# Patient Record
Sex: FEMALE | Race: OTHER | HISPANIC OR LATINO | URBAN - METROPOLITAN AREA
[De-identification: names, ages, dates, MRNs, and addresses within clinical notes are randomized per-mention and may not be internally consistent; named-entity substitution may affect disease eponyms.]

---

## 2021-10-14 ENCOUNTER — EMERGENCY (EMERGENCY)
Facility: HOSPITAL | Age: 26
LOS: 1 days | Discharge: HOME | End: 2021-10-14
Attending: EMERGENCY MEDICINE
Payer: SELF-PAY

## 2021-10-14 VITALS
HEART RATE: 83 BPM | TEMPERATURE: 98 F | OXYGEN SATURATION: 98 % | WEIGHT: 156.53 LBS | SYSTOLIC BLOOD PRESSURE: 137 MMHG | RESPIRATION RATE: 18 BRPM | DIASTOLIC BLOOD PRESSURE: 87 MMHG

## 2021-10-14 LAB — HCG UR QL: NEGATIVE — SIGNIFICANT CHANGE UP

## 2021-10-14 PROCEDURE — 99284 EMERGENCY DEPT VISIT MOD MDM: CPT

## 2021-10-14 RX ORDER — OXYCODONE AND ACETAMINOPHEN 5; 325 MG/1; MG/1
1 TABLET ORAL ONCE
Refills: 0 | Status: DISCONTINUED | OUTPATIENT
Start: 2021-10-14 | End: 2021-10-14

## 2021-10-15 PROCEDURE — 99284 EMERGENCY DEPT VISIT MOD MDM: CPT | Mod: 25

## 2021-10-15 PROCEDURE — 70450 CT HEAD/BRAIN W/O DYE: CPT | Mod: 26,MA

## 2021-10-15 PROCEDURE — 72125 CT NECK SPINE W/O DYE: CPT | Mod: 26,MA

## 2021-10-15 PROCEDURE — 72128 CT CHEST SPINE W/O DYE: CPT | Mod: MA

## 2021-10-15 PROCEDURE — 72125 CT NECK SPINE W/O DYE: CPT | Mod: MA

## 2021-10-15 PROCEDURE — 72131 CT LUMBAR SPINE W/O DYE: CPT | Mod: 26,MA

## 2021-10-15 PROCEDURE — 81025 URINE PREGNANCY TEST: CPT

## 2021-10-15 PROCEDURE — 72128 CT CHEST SPINE W/O DYE: CPT | Mod: 26,MA

## 2021-10-15 PROCEDURE — 70450 CT HEAD/BRAIN W/O DYE: CPT | Mod: MA

## 2021-10-15 PROCEDURE — 72131 CT LUMBAR SPINE W/O DYE: CPT | Mod: MA

## 2021-10-15 RX ADMIN — OXYCODONE AND ACETAMINOPHEN 1 TABLET(S): 5; 325 TABLET ORAL at 00:07

## 2021-10-15 RX ADMIN — OXYCODONE AND ACETAMINOPHEN 1 TABLET(S): 5; 325 TABLET ORAL at 00:06

## 2021-10-15 NOTE — ED PROVIDER NOTE - CARE PLAN
1 Principal Discharge DX:	Back pain  Secondary Diagnosis:	Neck pain  Secondary Diagnosis:	Headache due to trauma

## 2021-10-15 NOTE — ED PROVIDER NOTE - PHYSICAL EXAMINATION
generalized ttp neck and back. no swelling, no ecchymoses, no abrasions.  b/l UE and LE strength 5/5 all muscle groups and gross sensation normal and equal in all dermatomes.  ambulatory with steady gait.

## 2021-10-15 NOTE — ED PROVIDER NOTE - PATIENT PORTAL LINK FT
You can access the FollowMyHealth Patient Portal offered by [facility] by registering at the following website: [email or web address]. By joining [facility]’s FollowMyHealth portal, you will also be able to view your health information using other applications (apps) compatible with our system.

## 2021-10-15 NOTE — ED PROVIDER NOTE - NSFOLLOWUPINSTRUCTIONS_ED_ALL_ED_FT
Log Out.      [employer]® CareNotes®     :  [facility]  	                       BACK PAIN - AfterCare(R) Instructions(ER/ED)           Dolor de espalda    LO QUE NECESITA SABER:    El dolor de espalda es común. Usted puede tener dolor de espalda y espasmos musculares. Usted puede estar adolorido o sentir rigidez en [place] o ambos lados de la espalda. El dolor se puede propagar a la parte baja del cuerpo. Las condiciones que afectan la columna, las articulaciones, o los músculos pueden causar el dolor de espalda. Estos pueden incluir la artritis, estenosis de la hina dorsal (estrechamiento de la columna vertebral), tensión muscular o descomposición de los discos de la columna vertebral.    INSTRUCCIONES SOBRE EL NEGRA HOSPITALARIA:    Llame al número de emergencias local (911 en los [place]) si:  •Usted tiene dolor de espalda intenso con dolor en el pecho.      •Usted no puede controlar guerra orina ni rick deposiciones intestinales.      •El dolor se vuelve tan intenso que lo incapacita para caminar.      Regrese a la morris de emergencias si:  •Usted tiene dolor, entumecimiento o debilidad en morelia o en ambas piernas.      •Usted tiene dolor de espalda intenso, náuseas y vómito.      •Usted tiene un dolor de espalda intenso que se propaga a un costado o al área genital.      Llame a guerra médico si:  •Usted tiene dolor de espalda que no mejora con el reposo, ni con el medicamento para el dolor.      •Tiene fiebre.      •Usted tiene un dolor que empeora cuando está acostado boca arriba o al descansar.      •Usted tiene dolor que empeora cuando tose o estornuda.      •Usted pierde peso sin proponérselo.      •Usted tiene preguntas o inquietudes acerca de guerra condición o cuidado.      Medicamentos:Es posible que usted necesite alguno de los siguientes:   •Los DOMINIQUE,nas el ibuprofeno, ayudan a disminuir la inflamación, el dolor y la fiebre. Fausto medicamento está disponible con o sin morelia receta médica. Los DOMINIQUE pueden causar sangrado estomacal o problemas renales en ciertas personas. Si usted barbara un medicamento anticoagulante, siempre pregúntele a guerra médico si los DOMINIQUE son seguros para usted. Siempre yasmeen la etiqueta de fausto medicamento y siga las instrucciones.      •Acetaminofénalivia el dolor y baja la fiebre. Está disponible sin receta médica. Pregunte la cantidad y la frecuencia con que debe tomarlos. Siga las indicaciones. Yasmeen las etiquetas de todos los demás medicamentos que esté usando para saber si también contienen acetaminofén, o pregunte a guerra médico o farmacéutico. El acetaminofén puede causar daño en el hígado cuando no se barbara de forma correcta. No use más de 4 gramos (4000 miligramos) en total de acetaminofeno en un día.      •Relajantes muscularesayudan a disminuir los espasmos musculares y el dolor de espalda.      •Puede administrarsepodrían administrarse. Pregunte al médico cómo debe gennaro fausto medicamento de forma malcolm. Algunos medicamentos recetados para el dolor contienen acetaminofén. No tome otros medicamentos que contengan acetaminofén sin consultarlo con guerra médico. Demasiado acetaminofeno puede causar daño al hígado. Los medicamentos recetados para el dolor podrían causar estreñimiento. Pregunte a guerra médico nas prevenir o tratar estreñimiento.      •[place] rick medicamentos nas se le haya indicado.Consulte con guerra médico si usted keyla que guerra medicamento no le está ayudando o si presenta efectos secundarios. Infórmele si es alérgico a cualquier medicamento. Mantenga morelia lista actualizada de los medicamentos, las vitaminas y los productos herbales que barbara. Incluya los siguientes datos de los medicamentos: cantidad, frecuencia y motivo de administración. Traiga con usted la lista o los envases de las píldoras a rick citas de seguimiento. Lleve la lista de los medicamentos con usted en saud de morelia emergencia.      La forma de controlar guerra dolor de espalda:  •Aplique hieloen la espalda de 15 a 20 minutos cada hora o nas se le indique. Use morelia compresa de hielo o ponga hielo triturado en morelia bolsa de plástico. Cúbralo con morelia toalla antes de aplicarlo sobre guerra piel. El hielo disminuye el dolor y ayuda a evitar el daño en los tejidos.      •Aplique caloren la espalda de 20 a 30 minutos cada 2 horas por los días que le indiquen. El calor ayuda a disminuir el dolor y los espasmos musculares.      •Manténgase activolo más que pueda sin causar más dolor. El reposo en cama puede empeorar guerra dolor de espalda. Evite levantar objetos hasta que ya no tenga dolor.      •Vaya a terapia física según indicaciones.Un fisioterapeuta puede enseñarle ejercicios que contribuyan a mejorar guerra movimiento y fuerza, y a aliviar el dolor.      Acuda a morelia consulta de control con guerra médico dentro de 2 semanas, o según le hayan indicado:Es posible que necesite debi a un especialista. Anote rick preguntas para que se acuerde de hacerlas deshawn rick visitas.       © Copyright [employer] 2021           back to top                          © Copyright [employer] 2021

## 2021-10-15 NOTE — ED PROVIDER NOTE - PROGRESS NOTE DETAILS
pain improved. cts negative for acute pahtology. advised motrin/tyelnol for pain. f/u with PMD. return precautions discussed.

## 2021-10-15 NOTE — ED PROVIDER NOTE - OBJECTIVE STATEMENT
25 year old female denies PMH coming in s/p fall from a ladder about 10 feet while at work. landed on her back and hit the back of her head. denies LOC. ambulatory after with pain. denies all other complaints.

## 2021-10-15 NOTE — ED PROVIDER NOTE - CLINICAL SUMMARY MEDICAL DECISION MAKING FREE TEXT BOX
25 year old female s/p fall from ladder. vitals WNL. PE as above.  pain control, ct head, neck, back, reassess